# Patient Record
(demographics unavailable — no encounter records)

---

## 2024-11-20 NOTE — HISTORY OF PRESENT ILLNESS
[FreeTextEntry1] : 11/19/24 - Please refer to NP note below. 76-year-old male with PMH of HTN, DM, HLD, hyperthyroidism, presents for evaluation of CP. Pt reports occasional lower CP for 1 year, described as tenderness, usually in the morning with nausea and vomiting, lasting for a few minutes, relieved with SL NTG. Pt also noted occasional palpitations for 1 year, with unknown triggers, lasting for a few minutes. Pt also noted occasional dizziness when BP is elevated. Pt denies SOB. Pt denies h/o syncope. He smokes 1/3 PPD and has 10 years smoking history. His home SBP ranged 120-130. Today's /79 P 95. Pt is on ASA, Losartan 50mg, Amlodipine 10mg, Vascepa, Metformin 1000mg, Januvia 100mg, Ozempic, Methimazole 5mg, Omeprazole-Sodium Bicarbonate 40-1680mg.  Patient has epigastric tenderness. I advised patient to undergo an echocardiogram and a treadmill stress test. I advised patient to take GI medication prescribed by PCP and consider nuclear stress test if symptoms persist.